# Patient Record
Sex: MALE | Race: OTHER | HISPANIC OR LATINO | URBAN - METROPOLITAN AREA
[De-identification: names, ages, dates, MRNs, and addresses within clinical notes are randomized per-mention and may not be internally consistent; named-entity substitution may affect disease eponyms.]

---

## 2023-01-10 ENCOUNTER — EMERGENCY (EMERGENCY)
Facility: HOSPITAL | Age: 43
LOS: 1 days | Discharge: HOME | End: 2023-01-10
Attending: STUDENT IN AN ORGANIZED HEALTH CARE EDUCATION/TRAINING PROGRAM
Payer: SELF-PAY

## 2023-01-10 VITALS
HEART RATE: 65 BPM | DIASTOLIC BLOOD PRESSURE: 88 MMHG | OXYGEN SATURATION: 98 % | WEIGHT: 229.94 LBS | TEMPERATURE: 98 F | SYSTOLIC BLOOD PRESSURE: 128 MMHG | RESPIRATION RATE: 18 BRPM

## 2023-01-10 PROCEDURE — 72100 X-RAY EXAM L-S SPINE 2/3 VWS: CPT | Mod: 26

## 2023-01-10 PROCEDURE — 99283 EMERGENCY DEPT VISIT LOW MDM: CPT | Mod: 25

## 2023-01-10 PROCEDURE — 99284 EMERGENCY DEPT VISIT MOD MDM: CPT

## 2023-01-10 PROCEDURE — 72100 X-RAY EXAM L-S SPINE 2/3 VWS: CPT

## 2023-01-10 RX ORDER — IBUPROFEN 200 MG
600 TABLET ORAL ONCE
Refills: 0 | Status: COMPLETED | OUTPATIENT
Start: 2023-01-10 | End: 2023-01-10

## 2023-01-10 RX ORDER — LIDOCAINE 4 G/100G
1 CREAM TOPICAL ONCE
Refills: 0 | Status: COMPLETED | OUTPATIENT
Start: 2023-01-10 | End: 2023-01-10

## 2023-01-10 RX ORDER — IBUPROFEN 200 MG
1 TABLET ORAL
Qty: 20 | Refills: 0
Start: 2023-01-10

## 2023-01-10 RX ADMIN — LIDOCAINE 1 PATCH: 4 CREAM TOPICAL at 11:57

## 2023-01-10 RX ADMIN — Medication 600 MILLIGRAM(S): at 11:57

## 2023-01-10 NOTE — ED PROVIDER NOTE - PHYSICAL EXAMINATION
CONSTITUTIONAL: non-toxic, well appearing, + airway intact, GCS 15  SKIN: no rash, no petechiae. no ecchymosis, no lacerations  EYES: PERRL, EOMI,  ENT: tongue midline,  NECK: Supple; no cervical-thoracic spine tenderness, midline bony tenderness over lumbar spine, bilateral paraverebral muscle tenderness over lumbar spine, no point tenderness   CARD: RRR, equal radial pulses bilaterally 2+  RESP: CTAB, no respiratory distress, no crepitus over chest wall  ABD: Soft, non-tender, non-distended  PELVIS: stable  EXT: Normal ROM x4. no bony tenderness, equal strength bilaterally  NEURO: Alert, oriented. CN2-12 intact, equal strength bilaterally, nl gait.  PSYCH: Cooperative, appropriate.

## 2023-01-10 NOTE — ED PROVIDER NOTE - PROGRESS NOTE DETAILS
Sushant-: pt seen and re-evaluated at bedside.  Pt states their symptoms have improved.  Pt comfortable in NAD.  XR neg per my wet read, pending radiology read, pt aware. Pt ambulatory without assistance. Discussed supportive care, PRN spine follow up, return precautions, pt understood and agreeable with plan.

## 2023-01-10 NOTE — ED ADULT NURSE NOTE - OBJECTIVE STATEMENT
States he fell from a ladder about 4 feet while at work yesterday States he landed on his feet ,no LOC or head injury .c/o lower back pain .

## 2023-01-10 NOTE — ED PROVIDER NOTE - PATIENT PORTAL LINK FT
You can access the FollowMyHealth Patient Portal offered by [facility] by registering at the following website: [email or web address]. By joining [employer]’s FollowMyHealth portal, you will also be able to view your health information using other applications (apps) compatible with our system.

## 2023-01-10 NOTE — ED PROVIDER NOTE - CLINICAL SUMMARY MEDICAL DECISION MAKING FREE TEXT BOX
Tati: 42-year-old male with no pertinent past medical history presents status post fall from height yesterday.  Patient states he fell off ladder from approximately 4 feet above ground landing on bilateral feet.  Denies any head strike or LOC, patient ambulatory post fall.  Patient reports bilateral low back pain.  Patient seen at urgent care yesterday, states x-ray was unavailable, was sent to the emergency departments for further evaluation and management.  Patient denies any headache, vision change, neck pain, chest pain, shortness of breath, abdominal pain, radiculopathy, numbness, weakness, saddle anesthesia, bowel/bladder dysfunction, or rash.  Denies any aspirin or anticoagulation use.  Patient states he has not taken any medications for his symptoms.  Pt with midline lumbar tenderness, no point tenderness. Neuro exam nonfocal, 5/5 strength BLE, pt ambulatory without assistance, exam not consistent with cauda equina syndrome or cord compression. Will obtain XR eval for compression fx, supportive treatment with dispo pending workup.

## 2023-01-10 NOTE — ED PROVIDER NOTE - OBJECTIVE STATEMENT
42-year-old male with no pertinent past medical history presents status post fall from height yesterday.  Patient states he fell off ladder from approximately 4 feet above ground landing on bilateral feet.  Denies any head strike or LOC, patient ambulatory post fall.  Patient reports bilateral low back pain.  Patient seen at urgent care yesterday, states x-ray was unavailable, was sent to the emergency departments for further evaluation and management.  Patient denies any headache, vision change, neck pain, chest pain, shortness of breath, abdominal pain, radiculopathy, numbness, weakness, saddle anesthesia, bowel/bladder dysfunction, or rash.  Denies any aspirin or anticoagulation use.  Patient states he has not taken any medications for his symptoms.  Denies any additional complaints.

## 2023-01-10 NOTE — ED PROVIDER NOTE - NSFOLLOWUPINSTRUCTIONS_ED_ALL_ED_FT
Back Pain    Back pain is very common in adults. The cause of back pain is rarely dangerous and the pain often gets better over time. The cause of your back pain may not be known and may include strain of muscles or ligaments, degeneration of the spinal disks, or arthritis. Occasionally the pain may radiate down your leg(s). Over-the-counter medicines to reduce pain and inflammation are often the most helpful. Stretching and remaining active frequently helps the healing process.     Rest and stay well hydrated.    There are spine specialists within the [facility] system you may call [phone] for your back pain, call and arrange your follow up appointment.     Take over the counter acetaminophen (Tylenol) 650-1000 mg every 4-6 hours as needed for pain. Do not take more than 3000 mg in a 24 hour period. Be aware many over the counter and prescription medications also contain acetaminophen (Tylenol).     Take prescription ibuprofen 600 mg every 6 hours with food as needed for pain.    Apply over the counter lidocaine patch to affected area as needed for pain; leave on for 12 hours, leave off for up to 12 hours.     SEEK IMMEDIATE MEDICAL CARE IF YOU HAVE ANY OF THE FOLLOWING SYMPTOMS: bowel or bladder control problems, unusual weakness or numbness in your arms or legs, nausea or vomiting, abdominal pain, fever, dizziness/lightheadedness.

## 2023-08-16 NOTE — ED PROVIDER NOTE - NS_EDPROVIDERDISPOUSERTYPE_ED_A_ED
The patient was educated on the use of a epatch monitor. The patient's comprehension was high. The patient was able to verbalize recall. The patient was instructed on how and when to return the monitor.
Attending Attestation (For Attendings USE Only)...